# Patient Record
Sex: MALE | Race: WHITE | NOT HISPANIC OR LATINO
[De-identification: names, ages, dates, MRNs, and addresses within clinical notes are randomized per-mention and may not be internally consistent; named-entity substitution may affect disease eponyms.]

---

## 2017-02-06 ENCOUNTER — APPOINTMENT (OUTPATIENT)
Dept: UROLOGY | Facility: CLINIC | Age: 52
End: 2017-02-06

## 2017-02-06 VITALS
WEIGHT: 170 LBS | BODY MASS INDEX: 25.76 KG/M2 | SYSTOLIC BLOOD PRESSURE: 134 MMHG | HEIGHT: 68 IN | DIASTOLIC BLOOD PRESSURE: 84 MMHG | HEART RATE: 82 BPM

## 2017-02-06 DIAGNOSIS — Z78.9 OTHER SPECIFIED HEALTH STATUS: ICD-10-CM

## 2017-02-06 DIAGNOSIS — Z82.49 FAMILY HISTORY OF ISCHEMIC HEART DISEASE AND OTHER DISEASES OF THE CIRCULATORY SYSTEM: ICD-10-CM

## 2017-02-06 DIAGNOSIS — N13.8 BENIGN PROSTATIC HYPERPLASIA WITH LOWER URINARY TRACT SYMPMS: ICD-10-CM

## 2017-02-06 DIAGNOSIS — Z80.1 FAMILY HISTORY OF MALIGNANT NEOPLASM OF TRACHEA, BRONCHUS AND LUNG: ICD-10-CM

## 2017-02-06 DIAGNOSIS — F17.200 NICOTINE DEPENDENCE, UNSPECIFIED, UNCOMPLICATED: ICD-10-CM

## 2017-02-06 DIAGNOSIS — N40.1 BENIGN PROSTATIC HYPERPLASIA WITH LOWER URINARY TRACT SYMPMS: ICD-10-CM

## 2017-02-06 DIAGNOSIS — R97.20 ELEVATED PROSTATE, SPECIFIC ANTIGEN [PSA]: ICD-10-CM

## 2017-02-07 LAB
APPEARANCE: CLEAR
BACTERIA: NEGATIVE
BILIRUBIN URINE: NEGATIVE
BLOOD URINE: NEGATIVE
COLOR: YELLOW
CORE LAB FLUID CYTOLOGY: NORMAL
GLUCOSE QUALITATIVE U: NORMAL MG/DL
KETONES URINE: NEGATIVE
LEUKOCYTE ESTERASE URINE: NEGATIVE
MICROSCOPIC-UA: NORMAL
NITRITE URINE: NEGATIVE
PH URINE: 6
PROTEIN URINE: NEGATIVE MG/DL
PSA FREE FLD-MCNC: 8.5 %
PSA FREE SERPL-MCNC: 0.36 NG/ML
PSA SERPL-MCNC: 4.26 NG/ML
RED BLOOD CELLS URINE: 0 /HPF
SPECIFIC GRAVITY URINE: 1.01
SQUAMOUS EPITHELIAL CELLS: 0 /HPF
UROBILINOGEN URINE: NORMAL MG/DL
WHITE BLOOD CELLS URINE: 0 /HPF

## 2017-03-16 ENCOUNTER — APPOINTMENT (OUTPATIENT)
Dept: UROLOGY | Facility: CLINIC | Age: 52
End: 2017-03-16

## 2017-11-02 ENCOUNTER — RESULT REVIEW (OUTPATIENT)
Age: 52
End: 2017-11-02

## 2017-11-03 ENCOUNTER — OUTPATIENT (OUTPATIENT)
Dept: OUTPATIENT SERVICES | Facility: HOSPITAL | Age: 52
LOS: 1 days | End: 2017-11-03
Payer: COMMERCIAL

## 2017-11-03 DIAGNOSIS — Z98.1 ARTHRODESIS STATUS: Chronic | ICD-10-CM

## 2017-11-03 DIAGNOSIS — Z98.89 OTHER SPECIFIED POSTPROCEDURAL STATES: Chronic | ICD-10-CM

## 2017-11-03 DIAGNOSIS — C61 MALIGNANT NEOPLASM OF PROSTATE: ICD-10-CM

## 2017-11-03 LAB — SURGICAL PATHOLOGY STUDY: SIGNIFICANT CHANGE UP

## 2017-11-06 ENCOUNTER — INPATIENT (INPATIENT)
Facility: HOSPITAL | Age: 52
LOS: 1 days | Discharge: HOME | DRG: 708 | End: 2017-11-08
Attending: UROLOGY | Admitting: UROLOGY
Payer: COMMERCIAL

## 2017-11-06 ENCOUNTER — RESULT REVIEW (OUTPATIENT)
Age: 52
End: 2017-11-06

## 2017-11-06 VITALS
RESPIRATION RATE: 18 BRPM | HEIGHT: 68 IN | DIASTOLIC BLOOD PRESSURE: 71 MMHG | SYSTOLIC BLOOD PRESSURE: 115 MMHG | TEMPERATURE: 97 F | WEIGHT: 177.25 LBS | OXYGEN SATURATION: 98 % | HEART RATE: 65 BPM

## 2017-11-06 DIAGNOSIS — Z98.1 ARTHRODESIS STATUS: Chronic | ICD-10-CM

## 2017-11-06 DIAGNOSIS — S49.90XA UNSPECIFIED INJURY OF SHOULDER AND UPPER ARM, UNSPECIFIED ARM, INITIAL ENCOUNTER: Chronic | ICD-10-CM

## 2017-11-06 DIAGNOSIS — Z90.49 ACQUIRED ABSENCE OF OTHER SPECIFIED PARTS OF DIGESTIVE TRACT: Chronic | ICD-10-CM

## 2017-11-06 DIAGNOSIS — Z98.89 OTHER SPECIFIED POSTPROCEDURAL STATES: Chronic | ICD-10-CM

## 2017-11-06 DIAGNOSIS — R07.81 PLEURODYNIA: ICD-10-CM

## 2017-11-06 LAB
ANION GAP SERPL CALC-SCNC: 8 MMOL/L — SIGNIFICANT CHANGE UP (ref 5–17)
BASOPHILS NFR BLD AUTO: 0.1 % — SIGNIFICANT CHANGE UP (ref 0–2)
BUN SERPL-MCNC: 19 MG/DL — SIGNIFICANT CHANGE UP (ref 7–23)
CALCIUM SERPL-MCNC: 8.8 MG/DL — SIGNIFICANT CHANGE UP (ref 8.4–10.5)
CHLORIDE SERPL-SCNC: 100 MMOL/L — SIGNIFICANT CHANGE UP (ref 96–108)
CK MB CFR SERPL CALC: 2.6 NG/ML — SIGNIFICANT CHANGE UP (ref 0–6.7)
CK SERPL-CCNC: 113 U/L — SIGNIFICANT CHANGE UP (ref 30–200)
CO2 SERPL-SCNC: 27 MMOL/L — SIGNIFICANT CHANGE UP (ref 22–31)
CREAT SERPL-MCNC: 1.15 MG/DL — SIGNIFICANT CHANGE UP (ref 0.5–1.3)
EOSINOPHIL NFR BLD AUTO: 1.5 % — SIGNIFICANT CHANGE UP (ref 0–6)
GLUCOSE SERPL-MCNC: 133 MG/DL — HIGH (ref 70–99)
HCT VFR BLD CALC: 36.3 % — LOW (ref 39–50)
HGB BLD-MCNC: 12.5 G/DL — LOW (ref 13–17)
INR BLD: 1.06 — SIGNIFICANT CHANGE UP (ref 0.88–1.16)
LYMPHOCYTES # BLD AUTO: 17.6 % — SIGNIFICANT CHANGE UP (ref 13–44)
MCHC RBC-ENTMCNC: 27.8 PG — SIGNIFICANT CHANGE UP (ref 27–34)
MCHC RBC-ENTMCNC: 34.4 G/DL — SIGNIFICANT CHANGE UP (ref 32–36)
MCV RBC AUTO: 80.8 FL — SIGNIFICANT CHANGE UP (ref 80–100)
MONOCYTES NFR BLD AUTO: 2.2 % — SIGNIFICANT CHANGE UP (ref 2–14)
NEUTROPHILS NFR BLD AUTO: 78.6 % — HIGH (ref 43–77)
PLATELET # BLD AUTO: 161 K/UL — SIGNIFICANT CHANGE UP (ref 150–400)
POTASSIUM SERPL-MCNC: 4.8 MMOL/L — SIGNIFICANT CHANGE UP (ref 3.5–5.3)
POTASSIUM SERPL-SCNC: 4.8 MMOL/L — SIGNIFICANT CHANGE UP (ref 3.5–5.3)
PROTHROM AB SERPL-ACNC: 11.8 SEC — SIGNIFICANT CHANGE UP (ref 9.8–12.7)
RBC # BLD: 4.49 M/UL — SIGNIFICANT CHANGE UP (ref 4.2–5.8)
RBC # FLD: 12.8 % — SIGNIFICANT CHANGE UP (ref 10.3–16.9)
SODIUM SERPL-SCNC: 135 MMOL/L — SIGNIFICANT CHANGE UP (ref 135–145)
TROPONIN T SERPL-MCNC: <0.01 NG/ML — SIGNIFICANT CHANGE UP (ref 0–0.01)
WBC # BLD: 7.3 K/UL — SIGNIFICANT CHANGE UP (ref 3.8–10.5)
WBC # FLD AUTO: 7.3 K/UL — SIGNIFICANT CHANGE UP (ref 3.8–10.5)

## 2017-11-06 PROCEDURE — 71010: CPT | Mod: 26

## 2017-11-06 PROCEDURE — 93306 TTE W/DOPPLER COMPLETE: CPT | Mod: 26

## 2017-11-06 PROCEDURE — 99244 OFF/OP CNSLTJ NEW/EST MOD 40: CPT

## 2017-11-06 RX ORDER — MORPHINE SULFATE 50 MG/1
4 CAPSULE, EXTENDED RELEASE ORAL ONCE
Qty: 0 | Refills: 0 | Status: DISCONTINUED | OUTPATIENT
Start: 2017-11-06 | End: 2017-11-06

## 2017-11-06 RX ORDER — SENNA PLUS 8.6 MG/1
2 TABLET ORAL AT BEDTIME
Qty: 0 | Refills: 0 | Status: DISCONTINUED | OUTPATIENT
Start: 2017-11-06 | End: 2017-11-08

## 2017-11-06 RX ORDER — ONDANSETRON 8 MG/1
4 TABLET, FILM COATED ORAL EVERY 6 HOURS
Qty: 0 | Refills: 0 | Status: DISCONTINUED | OUTPATIENT
Start: 2017-11-06 | End: 2017-11-08

## 2017-11-06 RX ORDER — BUPROPION HYDROCHLORIDE 150 MG/1
300 TABLET, EXTENDED RELEASE ORAL DAILY
Qty: 0 | Refills: 0 | Status: DISCONTINUED | OUTPATIENT
Start: 2017-11-06 | End: 2017-11-08

## 2017-11-06 RX ORDER — LIDOCAINE 4 G/100G
1 CREAM TOPICAL
Qty: 0 | Refills: 0 | Status: DISCONTINUED | OUTPATIENT
Start: 2017-11-06 | End: 2017-11-08

## 2017-11-06 RX ORDER — BUPROPION HYDROCHLORIDE 150 MG/1
0 TABLET, EXTENDED RELEASE ORAL
Qty: 0 | Refills: 0 | COMMUNITY

## 2017-11-06 RX ORDER — OXYBUTYNIN CHLORIDE 5 MG
5 TABLET ORAL THREE TIMES A DAY
Qty: 0 | Refills: 0 | Status: DISCONTINUED | OUTPATIENT
Start: 2017-11-06 | End: 2017-11-08

## 2017-11-06 RX ORDER — BENZOCAINE AND MENTHOL 5; 1 G/100ML; G/100ML
1 LIQUID ORAL
Qty: 0 | Refills: 0 | Status: DISCONTINUED | OUTPATIENT
Start: 2017-11-06 | End: 2017-11-08

## 2017-11-06 RX ORDER — DOCUSATE SODIUM 100 MG
100 CAPSULE ORAL THREE TIMES A DAY
Qty: 0 | Refills: 0 | Status: DISCONTINUED | OUTPATIENT
Start: 2017-11-06 | End: 2017-11-08

## 2017-11-06 RX ORDER — SODIUM CHLORIDE 9 MG/ML
1000 INJECTION INTRAMUSCULAR; INTRAVENOUS; SUBCUTANEOUS
Qty: 0 | Refills: 0 | Status: DISCONTINUED | OUTPATIENT
Start: 2017-11-06 | End: 2017-11-08

## 2017-11-06 RX ORDER — ACETAMINOPHEN 500 MG
650 TABLET ORAL EVERY 6 HOURS
Qty: 0 | Refills: 0 | Status: DISCONTINUED | OUTPATIENT
Start: 2017-11-06 | End: 2017-11-08

## 2017-11-06 RX ORDER — OXYCODONE AND ACETAMINOPHEN 5; 325 MG/1; MG/1
1 TABLET ORAL EVERY 4 HOURS
Qty: 0 | Refills: 0 | Status: DISCONTINUED | OUTPATIENT
Start: 2017-11-06 | End: 2017-11-08

## 2017-11-06 RX ORDER — HYDROMORPHONE HYDROCHLORIDE 2 MG/ML
0.5 INJECTION INTRAMUSCULAR; INTRAVENOUS; SUBCUTANEOUS ONCE
Qty: 0 | Refills: 0 | Status: DISCONTINUED | OUTPATIENT
Start: 2017-11-06 | End: 2017-11-06

## 2017-11-06 RX ORDER — OXYCODONE AND ACETAMINOPHEN 5; 325 MG/1; MG/1
2 TABLET ORAL EVERY 6 HOURS
Qty: 0 | Refills: 0 | Status: DISCONTINUED | OUTPATIENT
Start: 2017-11-06 | End: 2017-11-08

## 2017-11-06 RX ORDER — CEFTRIAXONE 500 MG/1
1 INJECTION, POWDER, FOR SOLUTION INTRAMUSCULAR; INTRAVENOUS EVERY 24 HOURS
Qty: 0 | Refills: 0 | Status: DISCONTINUED | OUTPATIENT
Start: 2017-11-06 | End: 2017-11-08

## 2017-11-06 RX ORDER — DIAZEPAM 5 MG
5 TABLET ORAL THREE TIMES A DAY
Qty: 0 | Refills: 0 | Status: DISCONTINUED | OUTPATIENT
Start: 2017-11-06 | End: 2017-11-08

## 2017-11-06 RX ADMIN — Medication 100 MILLIGRAM(S): at 21:34

## 2017-11-06 RX ADMIN — Medication 5 MILLIGRAM(S): at 20:42

## 2017-11-06 RX ADMIN — Medication 650 MILLIGRAM(S): at 14:56

## 2017-11-06 RX ADMIN — SENNA PLUS 2 TABLET(S): 8.6 TABLET ORAL at 21:33

## 2017-11-06 RX ADMIN — Medication 1 MILLIGRAM(S): at 10:24

## 2017-11-06 RX ADMIN — MORPHINE SULFATE 4 MILLIGRAM(S): 50 CAPSULE, EXTENDED RELEASE ORAL at 11:00

## 2017-11-06 RX ADMIN — MORPHINE SULFATE 4 MILLIGRAM(S): 50 CAPSULE, EXTENDED RELEASE ORAL at 23:47

## 2017-11-06 RX ADMIN — BENZOCAINE AND MENTHOL 1 LOZENGE: 5; 1 LIQUID ORAL at 23:56

## 2017-11-06 RX ADMIN — MORPHINE SULFATE 102 MILLIGRAM(S): 50 CAPSULE, EXTENDED RELEASE ORAL at 10:15

## 2017-11-06 RX ADMIN — Medication 5 MILLIGRAM(S): at 11:54

## 2017-11-06 RX ADMIN — Medication 5 MILLIGRAM(S): at 14:56

## 2017-11-06 RX ADMIN — HYDROMORPHONE HYDROCHLORIDE 0.5 MILLIGRAM(S): 2 INJECTION INTRAMUSCULAR; INTRAVENOUS; SUBCUTANEOUS at 09:50

## 2017-11-06 RX ADMIN — BUPROPION HYDROCHLORIDE 300 MILLIGRAM(S): 150 TABLET, EXTENDED RELEASE ORAL at 18:08

## 2017-11-06 RX ADMIN — Medication 650 MILLIGRAM(S): at 15:56

## 2017-11-06 RX ADMIN — Medication 650 MILLIGRAM(S): at 21:33

## 2017-11-06 RX ADMIN — OXYCODONE AND ACETAMINOPHEN 2 TABLET(S): 5; 325 TABLET ORAL at 13:00

## 2017-11-06 RX ADMIN — CEFTRIAXONE 100 GRAM(S): 500 INJECTION, POWDER, FOR SOLUTION INTRAMUSCULAR; INTRAVENOUS at 18:35

## 2017-11-06 RX ADMIN — MORPHINE SULFATE 102 MILLIGRAM(S): 50 CAPSULE, EXTENDED RELEASE ORAL at 10:24

## 2017-11-06 RX ADMIN — SODIUM CHLORIDE 125 MILLILITER(S): 9 INJECTION INTRAMUSCULAR; INTRAVENOUS; SUBCUTANEOUS at 13:10

## 2017-11-06 RX ADMIN — OXYCODONE AND ACETAMINOPHEN 2 TABLET(S): 5; 325 TABLET ORAL at 11:54

## 2017-11-06 RX ADMIN — HYDROMORPHONE HYDROCHLORIDE 0.5 MILLIGRAM(S): 2 INJECTION INTRAMUSCULAR; INTRAVENOUS; SUBCUTANEOUS at 10:21

## 2017-11-06 RX ADMIN — Medication 650 MILLIGRAM(S): at 22:30

## 2017-11-06 NOTE — CONSULT NOTE ADULT - SUBJECTIVE AND OBJECTIVE BOX
52 year old M, PMH significant for prostate cancer, admitted to urology service for robotic-assisted laparoscopic prostatectomy (RALP) POD #0, former smoker with COPD, spinal stenosis. Cardiology team consulted regarding chest pain post procedure      PAST MEDICAL & SURGICAL HISTORY:  Prostate cancer  Nephritis  Herniated disc, cervical: and lumbar  Spinal stenosis of lumbar region  Spinal stenosis in cervical region  Anxiety and depression  Injury of shoulder: right rotator cuff  History of appendectomy  S/P lumbar spinal fusion: x2-L5, S1 and 2nd surgery had L4 &quot;within the last 3 years&quot;  S/P cervical spinal fusion: C4, C5, C6-&quot;within the last 3 years&quot;  H/O cystoscopy: 1977    FAMILY HISTORY:  Family history of renal disease (Father)  Family history of CHF (congestive heart failure) (Father)    Social:  Allergies    No Known Allergies    Intolerances    	  MEDICATIONS:    cefTRIAXone   IVPB 1 Gram(s) IV Intermittent every 24 hours      buPROPion XL . 300 milliGRAM(s) Oral daily  diazepam    Tablet 5 milliGRAM(s) Oral three times a day PRN  ondansetron Injectable 4 milliGRAM(s) IV Push every 6 hours PRN  oxyCODONE    5 mG/acetaminophen 325 mG 1 Tablet(s) Oral every 4 hours PRN  oxyCODONE    5 mG/acetaminophen 325 mG 2 Tablet(s) Oral every 6 hours PRN        benzocaine 15 mG/menthol 3.6 mG Lozenge 1 Lozenge Oral every 2 hours PRN  lidocaine 2% Gel 1 Application(s) Topical every 3 hours PRN  oxybutynin 5 milliGRAM(s) Oral three times a day PRN  sodium chloride 0.9%. 1000 milliLiter(s) IV Continuous <Continuous>            PHYSICAL EXAM:  T(C): 36.4 (11-06-17 @ 09:21), Max: 36.4 (11-06-17 @ 09:21)  HR: 64 (11-06-17 @ 12:21) (60 - 72)  BP: 125/73 (11-06-17 @ 12:21) (115/71 - 151/87)  RR: 10 (11-06-17 @ 12:21) (9 - 19)  SpO2: 98% (11-06-17 @ 12:21) (93% - 100%)  Wt(kg): --  I&O's Summary    06 Nov 2017 07:01  -  06 Nov 2017 13:11  --------------------------------------------------------  IN: 500 mL / OUT: 440 mL / NET: 60 mL      Height (cm): 172.72 (11-06 @ 06:25)  Weight (kg): 80.4 (11-06 @ 06:25)  BMI (kg/m2): 27 (11-06 @ 06:25)  BSA (m2): 1.94 (11-06 @ 06:25)    Gen: WDWN M in NAD, resting comfortably on bed at time of examination  Neck: no JVD  Cardiovascular: Normal S1 S2, No murmurs, rubs, or gallops appreciated. Reproducible pain to palpation on R chest  Respiratory: CTAB. Patient has pleuritic pain upon deep inspiration  Gastrointestinal:  Soft, TTP post operative   Ext: no lower extremity edema appreciated  Neuro: no focal deficits.  Vascular: Peripheral pulses palpable 2+ bilaterally    TELEMETRY: 	    ECG: Normal sinus, no acute ST-T changes suggestive of ischemia 	  RADIOLOGY: CXR without evidence of pleural effusion or consolidation  DIAGNOSTIC TESTING:  [X] Echocardiogram: No cardiac abnormalities  [ ]  Catheterization:  [ ] Stress Test:    OTHER: 	    LABS:	 	    CARDIAC MARKERS: troponin T, CK, CKMB WNL x1.                                   12.5   7.3   )-----------( 161      ( 06 Nov 2017 09:45 )             36.3     11-06    135  |  100  |  19  ----------------------------<  133<H>  4.8   |  27  |  1.15    Ca    8.8      06 Nov 2017 09:45      proBNP:   Lipid Profile:   HgA1c:   TSH:

## 2017-11-06 NOTE — PATIENT PROFILE ADULT. - PMH
Anxiety and depression    Herniated disc, cervical  and lumbar  Nephritis    Spinal stenosis in cervical region    Spinal stenosis of lumbar region

## 2017-11-06 NOTE — CONSULT NOTE ADULT - PROBLEM SELECTOR RECOMMENDATION 9
- Patient with exam findings reproducible chest pain on palpation, worse with inspiration, sharp  - Likely pleuritic etiology given above findings, less likely ischemic in setting of lack of cardiac history and lack of typical anginal attributes  - Troponin T, CK, CKMB negative x1, EKG reviewed normal sinus rhythm with no acute ST-T changes suggestive of ischemia; echocardiogram shows no evidence of cardiac abnormalities  - No need for telemetry at this time  - Suspect pleurisy/costochondritis, chest pain noncardiac in origin

## 2017-11-06 NOTE — CONSULT NOTE ADULT - ASSESSMENT
52M, PMH prostate cancer, POD #0 s/p lap prostatectomy, former smoker with COPD, spinal stenosis. Cardiology consulted for chest pain evaluation

## 2017-11-06 NOTE — H&P ADULT - FAMILY HISTORY
Father  Still living? No  Family history of CHF (congestive heart failure), Age at diagnosis: Age Unknown  Family history of renal disease, Age at diagnosis: Age Unknown

## 2017-11-06 NOTE — PROGRESS NOTE ADULT - SUBJECTIVE AND OBJECTIVE BOX
Pt reporting chest pain. The pain is on the right side and is sharp in nature. The pain is radiating up to the right side of his neck. Of note patient with prior history of neck and back surgery. No cardiac history. Anesthesia saw patient who recommended an EKG and a cardiology consult. 8mg morphine given. EKG shows NSR. AVSS. Cardiology to see- recommending for now a CXR, cardiac enzymes and an ECHO, all of which have been ordered.             Vital Signs Last 24 Hrs  T(C): 36.4 (06 Nov 2017 09:21), Max: 36.4 (06 Nov 2017 09:21)  T(F): 97.6 (06 Nov 2017 09:21), Max: 97.6 (06 Nov 2017 09:21)  HR: 64 (06 Nov 2017 10:51) (62 - 70)  BP: 121/80 (06 Nov 2017 10:21) (115/71 - 151/87)  BP(mean): 94 (06 Nov 2017 10:21) (94 - 113)  RR: 13 (06 Nov 2017 10:51) (9 - 18)  SpO2: 93% (06 Nov 2017 10:51) (93% - 100%)    I&O's Summary    06 Nov 2017 07:01  -  06 Nov 2017 10:55  --------------------------------------------------------  IN: 125 mL / OUT: 0 mL / NET: 125 mL        Gen: NAD    Abd: softly distended, pravin TTP, incisions CDI    : cifuentes draining clear                           12.5   7.3   )-----------( 161      ( 06 Nov 2017 09:45 )             36.3     11-06    135  |  100  |  19  ----------------------------<  133<H>  4.8   |  27  |  1.15    Ca    8.8      06 Nov 2017 09:45      cultures    A/P: 52M hx cap s/p RALP 11/6. Pt complaining of chest pain in the PACU  1- Cifuentes- monitor uop  2- CLIFTON- monitor output  3- NPO  4- cards to see: fu CXR, ECHO, cardiac enzymes  5- oob amb, IS  6- pain meds PRN

## 2017-11-06 NOTE — H&P ADULT - PMH
Anxiety and depression    Herniated disc, cervical  and lumbar  Nephritis    Spinal stenosis in cervical region    Spinal stenosis of lumbar region Anxiety and depression    Herniated disc, cervical  and lumbar  Nephritis    Prostate cancer    Spinal stenosis in cervical region    Spinal stenosis of lumbar region

## 2017-11-06 NOTE — PATIENT PROFILE ADULT. - PSH
H/O cystoscopy  1977  History of appendectomy    Injury of shoulder  right rotator cuff  S/P cervical spinal fusion  C4, C5, C6-"within the last 3 years"  S/P lumbar spinal fusion  x2-L5, S1 and 2nd surgery had L4 "within the last 3 years"

## 2017-11-07 ENCOUNTER — TRANSCRIPTION ENCOUNTER (OUTPATIENT)
Age: 52
End: 2017-11-07

## 2017-11-07 DIAGNOSIS — C61 MALIGNANT NEOPLASM OF PROSTATE: ICD-10-CM

## 2017-11-07 LAB
ANION GAP SERPL CALC-SCNC: 11 MMOL/L — SIGNIFICANT CHANGE UP (ref 5–17)
BASOPHILS NFR BLD AUTO: 0.1 % — SIGNIFICANT CHANGE UP (ref 0–2)
BUN SERPL-MCNC: 11 MG/DL — SIGNIFICANT CHANGE UP (ref 7–23)
CALCIUM SERPL-MCNC: 9.7 MG/DL — SIGNIFICANT CHANGE UP (ref 8.4–10.5)
CHLORIDE SERPL-SCNC: 105 MMOL/L — SIGNIFICANT CHANGE UP (ref 96–108)
CO2 SERPL-SCNC: 27 MMOL/L — SIGNIFICANT CHANGE UP (ref 22–31)
CREAT FLD-MCNC: 0.9 MG/DL — SIGNIFICANT CHANGE UP
CREAT SERPL-MCNC: 0.93 MG/DL — SIGNIFICANT CHANGE UP (ref 0.5–1.3)
EOSINOPHIL NFR BLD AUTO: 0.2 % — SIGNIFICANT CHANGE UP (ref 0–6)
GLUCOSE SERPL-MCNC: 130 MG/DL — HIGH (ref 70–99)
HCT VFR BLD CALC: 39.9 % — SIGNIFICANT CHANGE UP (ref 39–50)
HGB BLD-MCNC: 13.5 G/DL — SIGNIFICANT CHANGE UP (ref 13–17)
LYMPHOCYTES # BLD AUTO: 17.4 % — SIGNIFICANT CHANGE UP (ref 13–44)
MAGNESIUM SERPL-MCNC: 2.3 MG/DL — SIGNIFICANT CHANGE UP (ref 1.6–2.6)
MCHC RBC-ENTMCNC: 27.5 PG — SIGNIFICANT CHANGE UP (ref 27–34)
MCHC RBC-ENTMCNC: 33.8 G/DL — SIGNIFICANT CHANGE UP (ref 32–36)
MCV RBC AUTO: 81.3 FL — SIGNIFICANT CHANGE UP (ref 80–100)
MONOCYTES NFR BLD AUTO: 8.3 % — SIGNIFICANT CHANGE UP (ref 2–14)
NEUTROPHILS NFR BLD AUTO: 74 % — SIGNIFICANT CHANGE UP (ref 43–77)
PHOSPHATE SERPL-MCNC: 3.4 MG/DL — SIGNIFICANT CHANGE UP (ref 2.5–4.5)
PLATELET # BLD AUTO: 236 K/UL — SIGNIFICANT CHANGE UP (ref 150–400)
POTASSIUM SERPL-MCNC: 4.5 MMOL/L — SIGNIFICANT CHANGE UP (ref 3.5–5.3)
POTASSIUM SERPL-SCNC: 4.5 MMOL/L — SIGNIFICANT CHANGE UP (ref 3.5–5.3)
RBC # BLD: 4.91 M/UL — SIGNIFICANT CHANGE UP (ref 4.2–5.8)
RBC # FLD: 13.2 % — SIGNIFICANT CHANGE UP (ref 10.3–16.9)
SODIUM SERPL-SCNC: 143 MMOL/L — SIGNIFICANT CHANGE UP (ref 135–145)
SURGICAL PATHOLOGY STUDY: SIGNIFICANT CHANGE UP
WBC # BLD: 9.7 K/UL — SIGNIFICANT CHANGE UP (ref 3.8–10.5)
WBC # FLD AUTO: 9.7 K/UL — SIGNIFICANT CHANGE UP (ref 3.8–10.5)

## 2017-11-07 RX ORDER — DOCUSATE SODIUM 100 MG
1 CAPSULE ORAL
Qty: 12 | Refills: 0 | OUTPATIENT
Start: 2017-11-07 | End: 2017-11-13

## 2017-11-07 RX ORDER — OXYBUTYNIN CHLORIDE 5 MG
1 TABLET ORAL
Qty: 6 | Refills: 0 | OUTPATIENT
Start: 2017-11-07 | End: 2017-11-13

## 2017-11-07 RX ORDER — SIMETHICONE 80 MG/1
80 TABLET, CHEWABLE ORAL ONCE
Qty: 0 | Refills: 0 | Status: COMPLETED | OUTPATIENT
Start: 2017-11-07 | End: 2017-11-07

## 2017-11-07 RX ORDER — MOXIFLOXACIN HYDROCHLORIDE TABLETS, 400 MG 400 MG/1
1 TABLET, FILM COATED ORAL
Qty: 10 | Refills: 0 | OUTPATIENT
Start: 2017-11-07 | End: 2017-11-12

## 2017-11-07 RX ADMIN — Medication 650 MILLIGRAM(S): at 07:33

## 2017-11-07 RX ADMIN — Medication 650 MILLIGRAM(S): at 13:22

## 2017-11-07 RX ADMIN — SIMETHICONE 80 MILLIGRAM(S): 80 TABLET, CHEWABLE ORAL at 21:34

## 2017-11-07 RX ADMIN — BUPROPION HYDROCHLORIDE 300 MILLIGRAM(S): 150 TABLET, EXTENDED RELEASE ORAL at 11:18

## 2017-11-07 RX ADMIN — Medication 650 MILLIGRAM(S): at 06:33

## 2017-11-07 RX ADMIN — MORPHINE SULFATE 4 MILLIGRAM(S): 50 CAPSULE, EXTENDED RELEASE ORAL at 00:02

## 2017-11-07 RX ADMIN — Medication 100 MILLIGRAM(S): at 11:18

## 2017-11-07 RX ADMIN — Medication 5 MILLIGRAM(S): at 09:01

## 2017-11-07 RX ADMIN — Medication 650 MILLIGRAM(S): at 14:22

## 2017-11-07 RX ADMIN — SENNA PLUS 2 TABLET(S): 8.6 TABLET ORAL at 21:12

## 2017-11-07 RX ADMIN — Medication 100 MILLIGRAM(S): at 05:16

## 2017-11-07 RX ADMIN — Medication 5 MILLIGRAM(S): at 22:37

## 2017-11-07 RX ADMIN — Medication 100 MILLIGRAM(S): at 21:12

## 2017-11-07 RX ADMIN — Medication 5 MILLIGRAM(S): at 17:43

## 2017-11-07 RX ADMIN — Medication 5 MILLIGRAM(S): at 13:22

## 2017-11-07 RX ADMIN — CEFTRIAXONE 100 GRAM(S): 500 INJECTION, POWDER, FOR SOLUTION INTRAMUSCULAR; INTRAVENOUS at 17:35

## 2017-11-07 NOTE — DISCHARGE NOTE ADULT - CARE PLAN
Principal Discharge DX:	Prostate cancer  Goal:	improvement after surgery  Instructions for follow-up, activity and diet:	Advance diet with BM. Activity as tolerated. Ugalde to leg bag. Keep incisions CDI. If fever >100.4 or any change or worsening of symptoms please call doctor or report to ED. Make follow up appointment with Dr Teixeira call office [phone]

## 2017-11-07 NOTE — DISCHARGE NOTE ADULT - MEDICATION SUMMARY - MEDICATIONS TO TAKE
I will START or STAY ON the medications listed below when I get home from the hospital:    Colace 100 mg oral capsule  -- 1 cap(s) by mouth 2 times a day, As Needed -for constipation   -- Medication should be taken with plenty of water.    -- Indication: For for constipation    Cipro 500 mg oral tablet  -- 1 tab(s) by mouth every 12 hours start day before apt  -- Avoid prolonged or excessive exposure to direct and/or artificial sunlight while taking this medication.  Check with your doctor before becoming pregnant.  Do not take dairy products, antacids, or iron preparations within one hour of this medication.  Finish all this medication unless otherwise directed by prescriber.  Medication should be taken with plenty of water.    -- Indication: For Infection prophylaxis    Wellbutrin  -- 300 mg Qday  -- Indication: For Home med    Ditropan XL 15 mg/24 hr oral tablet, extended release  -- 1 tab(s) by mouth once a day, As Needed -for bladder spasms stop day before apt  -- May cause drowsiness.  Alcohol may intensify this effect.  Use care when operating dangerous machinery.  Swallow whole.  Do not crush.    -- Indication: For bladder spasms

## 2017-11-07 NOTE — DISCHARGE NOTE ADULT - CARE PROVIDER_API CALL
Mauricio Teixeira), Urology  [address] [place] [address], [place] [id number]  Phone: [phone]  Fax: [phone]

## 2017-11-07 NOTE — DISCHARGE NOTE ADULT - PLAN OF CARE
improvement after surgery Advance diet with BM. Activity as tolerated. Ugalde to leg bag. Keep incisions CDI. If fever >100.4 or any change or worsening of symptoms please call doctor or report to ED. Make follow up appointment with Dr Teixeira call office [phone]

## 2017-11-07 NOTE — DISCHARGE NOTE ADULT - PATIENT PORTAL LINK FT
“You can access the [facility] Patient Portal, offered by [facility], by registering with the following website: [email or web address]”

## 2017-11-07 NOTE — DISCHARGE NOTE ADULT - HOSPITAL COURSE
52M history cap underwent RALP 11/6 . Tolerated procedure well. VSS, afebrile and hemodynamically stable.

## 2017-11-08 VITALS
RESPIRATION RATE: 17 BRPM | SYSTOLIC BLOOD PRESSURE: 129 MMHG | DIASTOLIC BLOOD PRESSURE: 77 MMHG | HEART RATE: 82 BPM | OXYGEN SATURATION: 97 % | TEMPERATURE: 97 F

## 2017-11-08 LAB
ANION GAP SERPL CALC-SCNC: 12 MMOL/L — SIGNIFICANT CHANGE UP (ref 5–17)
BASOPHILS NFR BLD AUTO: 0.1 % — SIGNIFICANT CHANGE UP (ref 0–2)
BUN SERPL-MCNC: 11 MG/DL — SIGNIFICANT CHANGE UP (ref 7–23)
CALCIUM SERPL-MCNC: 9.3 MG/DL — SIGNIFICANT CHANGE UP (ref 8.4–10.5)
CHLORIDE SERPL-SCNC: 102 MMOL/L — SIGNIFICANT CHANGE UP (ref 96–108)
CO2 SERPL-SCNC: 27 MMOL/L — SIGNIFICANT CHANGE UP (ref 22–31)
CREAT SERPL-MCNC: 1.17 MG/DL — SIGNIFICANT CHANGE UP (ref 0.5–1.3)
EOSINOPHIL NFR BLD AUTO: 1.3 % — SIGNIFICANT CHANGE UP (ref 0–6)
GLUCOSE SERPL-MCNC: 94 MG/DL — SIGNIFICANT CHANGE UP (ref 70–99)
HCT VFR BLD CALC: 36.8 % — LOW (ref 39–50)
HGB BLD-MCNC: 12.3 G/DL — LOW (ref 13–17)
LYMPHOCYTES # BLD AUTO: 30.2 % — SIGNIFICANT CHANGE UP (ref 13–44)
MAGNESIUM SERPL-MCNC: 2.2 MG/DL — SIGNIFICANT CHANGE UP (ref 1.6–2.6)
MCHC RBC-ENTMCNC: 27.3 PG — SIGNIFICANT CHANGE UP (ref 27–34)
MCHC RBC-ENTMCNC: 33.4 G/DL — SIGNIFICANT CHANGE UP (ref 32–36)
MCV RBC AUTO: 81.6 FL — SIGNIFICANT CHANGE UP (ref 80–100)
MONOCYTES NFR BLD AUTO: 7.2 % — SIGNIFICANT CHANGE UP (ref 2–14)
NEUTROPHILS NFR BLD AUTO: 61.2 % — SIGNIFICANT CHANGE UP (ref 43–77)
PHOSPHATE SERPL-MCNC: 3.4 MG/DL — SIGNIFICANT CHANGE UP (ref 2.5–4.5)
PLATELET # BLD AUTO: 188 K/UL — SIGNIFICANT CHANGE UP (ref 150–400)
POTASSIUM SERPL-MCNC: 4.2 MMOL/L — SIGNIFICANT CHANGE UP (ref 3.5–5.3)
POTASSIUM SERPL-SCNC: 4.2 MMOL/L — SIGNIFICANT CHANGE UP (ref 3.5–5.3)
RBC # BLD: 4.51 M/UL — SIGNIFICANT CHANGE UP (ref 4.2–5.8)
RBC # FLD: 13.3 % — SIGNIFICANT CHANGE UP (ref 10.3–16.9)
SODIUM SERPL-SCNC: 141 MMOL/L — SIGNIFICANT CHANGE UP (ref 135–145)
WBC # BLD: 7.6 K/UL — SIGNIFICANT CHANGE UP (ref 3.8–10.5)
WBC # FLD AUTO: 7.6 K/UL — SIGNIFICANT CHANGE UP (ref 3.8–10.5)

## 2017-11-08 PROCEDURE — S2900: CPT

## 2017-11-08 PROCEDURE — 82570 ASSAY OF URINE CREATININE: CPT

## 2017-11-08 PROCEDURE — 86850 RBC ANTIBODY SCREEN: CPT

## 2017-11-08 PROCEDURE — 85025 COMPLETE CBC W/AUTO DIFF WBC: CPT

## 2017-11-08 PROCEDURE — 80048 BASIC METABOLIC PNL TOTAL CA: CPT

## 2017-11-08 PROCEDURE — 85610 PROTHROMBIN TIME: CPT

## 2017-11-08 PROCEDURE — 86901 BLOOD TYPING SEROLOGIC RH(D): CPT

## 2017-11-08 PROCEDURE — 86900 BLOOD TYPING SEROLOGIC ABO: CPT

## 2017-11-08 PROCEDURE — 88307 TISSUE EXAM BY PATHOLOGIST: CPT

## 2017-11-08 PROCEDURE — 84100 ASSAY OF PHOSPHORUS: CPT

## 2017-11-08 PROCEDURE — 83735 ASSAY OF MAGNESIUM: CPT

## 2017-11-08 PROCEDURE — 93306 TTE W/DOPPLER COMPLETE: CPT

## 2017-11-08 PROCEDURE — 88309 TISSUE EXAM BY PATHOLOGIST: CPT

## 2017-11-08 PROCEDURE — 82550 ASSAY OF CK (CPK): CPT

## 2017-11-08 PROCEDURE — 82553 CREATINE MB FRACTION: CPT

## 2017-11-08 PROCEDURE — 36415 COLL VENOUS BLD VENIPUNCTURE: CPT

## 2017-11-08 PROCEDURE — 84484 ASSAY OF TROPONIN QUANT: CPT

## 2017-11-08 PROCEDURE — 86923 COMPATIBILITY TEST ELECTRIC: CPT

## 2017-11-08 PROCEDURE — 71045 X-RAY EXAM CHEST 1 VIEW: CPT

## 2017-11-08 RX ADMIN — Medication 10 MILLIGRAM(S): at 08:26

## 2017-11-08 RX ADMIN — Medication 650 MILLIGRAM(S): at 03:10

## 2017-11-08 RX ADMIN — Medication 650 MILLIGRAM(S): at 04:10

## 2017-11-08 RX ADMIN — LIDOCAINE 1 APPLICATION(S): 4 CREAM TOPICAL at 07:48

## 2017-11-08 RX ADMIN — Medication 100 MILLIGRAM(S): at 06:10

## 2017-11-08 NOTE — PROGRESS NOTE ADULT - SUBJECTIVE AND OBJECTIVE BOX
INTERVAL HPI/OVERNIGHT EVENTS:  No acute events overnight.    VITALS:    T(F): 98.2 (11-08-17 @ 05:19), Max: 98.7 (11-07-17 @ 21:09)  HR: 69 (11-08-17 @ 05:19) (64 - 76)  BP: 113/69 (11-08-17 @ 05:19) (113/69 - 138/86)  RR: 18 (11-08-17 @ 05:19) (17 - 18)  SpO2: 97% (11-08-17 @ 05:19) (97% - 100%)  Wt(kg): --    I&O's Detail    06 Nov 2017 07:01  -  07 Nov 2017 07:00  --------------------------------------------------------  IN:    sodium chloride 0.9%.: 1375 mL  Total IN: 1375 mL    OUT:    Bulb: 160 mL    Indwelling Catheter - Urethral: 5175 mL  Total OUT: 5335 mL    Total NET: -3960 mL      07 Nov 2017 07:01  -  08 Nov 2017 06:04  --------------------------------------------------------  IN:  Total IN: 0 mL    OUT:    Bulb: 78 mL    Indwelling Catheter - Urethral: 1925 mL  Total OUT: 2003 mL    Total NET: -2003 mL          MEDICATIONS:    ANTIBIOTICS:  cefTRIAXone   IVPB 1 Gram(s) IV Intermittent every 24 hours      PAIN CONTROL:  acetaminophen   Tablet. 650 milliGRAM(s) Oral every 6 hours PRN  buPROPion XL . 300 milliGRAM(s) Oral daily  diazepam    Tablet 5 milliGRAM(s) Oral three times a day PRN  ondansetron Injectable 4 milliGRAM(s) IV Push every 6 hours PRN  oxyCODONE    5 mG/acetaminophen 325 mG 1 Tablet(s) Oral every 4 hours PRN  oxyCODONE    5 mG/acetaminophen 325 mG 2 Tablet(s) Oral every 6 hours PRN       MEDS:  oxybutynin 5 milliGRAM(s) Oral three times a day PRN      HEME/ONC        PHYSICAL EXAM:  General: No acute distress.  Alert and Oriented  Abdominal Exam: soft, incision sites clean and dry, CLIFTON intact serosanguinous   Exam: FC intact, urine clear      LABS:                        13.5   9.7   )-----------( 236      ( 07 Nov 2017 05:58 )             39.9     11-07    143  |  105  |  11  ----------------------------<  130<H>  4.5   |  27  |  0.93    Ca    9.7      07 Nov 2017 05:57  Phos  3.4     11-07  Mg     2.3     11-07      PT/INR - ( 06 Nov 2017 09:45 )   PT: 11.8 sec;   INR: 1.06                RADIOLOGY & ADDITIONAL TESTS:

## 2017-11-10 DIAGNOSIS — C61 MALIGNANT NEOPLASM OF PROSTATE: ICD-10-CM

## 2017-11-10 DIAGNOSIS — R07.81 PLEURODYNIA: ICD-10-CM

## 2017-11-10 DIAGNOSIS — J44.9 CHRONIC OBSTRUCTIVE PULMONARY DISEASE, UNSPECIFIED: ICD-10-CM

## 2017-11-10 DIAGNOSIS — M48.02 SPINAL STENOSIS, CERVICAL REGION: ICD-10-CM

## 2017-11-10 DIAGNOSIS — M48.061 SPINAL STENOSIS, LUMBAR REGION WITHOUT NEUROGENIC CLAUDICATION: ICD-10-CM

## 2017-11-10 DIAGNOSIS — F32.9 MAJOR DEPRESSIVE DISORDER, SINGLE EPISODE, UNSPECIFIED: ICD-10-CM

## 2017-11-10 DIAGNOSIS — N40.0 BENIGN PROSTATIC HYPERPLASIA WITHOUT LOWER URINARY TRACT SYMPTOMS: ICD-10-CM

## 2017-11-11 RX ORDER — PHENAZOPYRIDINE HCL 100 MG
200 TABLET ORAL ONCE
Qty: 0 | Refills: 0 | Status: COMPLETED | OUTPATIENT
Start: 2017-11-13 | End: 2017-11-13

## 2017-11-13 ENCOUNTER — OUTPATIENT (OUTPATIENT)
Dept: OUTPATIENT SERVICES | Facility: HOSPITAL | Age: 52
LOS: 1 days | Discharge: HOME | End: 2017-11-13
Payer: COMMERCIAL

## 2017-11-13 VITALS
TEMPERATURE: 97 F | SYSTOLIC BLOOD PRESSURE: 127 MMHG | DIASTOLIC BLOOD PRESSURE: 68 MMHG | RESPIRATION RATE: 20 BRPM | WEIGHT: 171.96 LBS | OXYGEN SATURATION: 100 % | HEIGHT: 68 IN | HEART RATE: 61 BPM

## 2017-11-13 VITALS
HEART RATE: 74 BPM | RESPIRATION RATE: 16 BRPM | OXYGEN SATURATION: 98 % | DIASTOLIC BLOOD PRESSURE: 72 MMHG | SYSTOLIC BLOOD PRESSURE: 124 MMHG | TEMPERATURE: 98 F

## 2017-11-13 DIAGNOSIS — Z98.89 OTHER SPECIFIED POSTPROCEDURAL STATES: Chronic | ICD-10-CM

## 2017-11-13 DIAGNOSIS — Z98.1 ARTHRODESIS STATUS: Chronic | ICD-10-CM

## 2017-11-13 DIAGNOSIS — S49.90XA UNSPECIFIED INJURY OF SHOULDER AND UPPER ARM, UNSPECIFIED ARM, INITIAL ENCOUNTER: Chronic | ICD-10-CM

## 2017-11-13 DIAGNOSIS — Z90.79 ACQUIRED ABSENCE OF OTHER GENITAL ORGAN(S): Chronic | ICD-10-CM

## 2017-11-13 DIAGNOSIS — Z90.49 ACQUIRED ABSENCE OF OTHER SPECIFIED PARTS OF DIGESTIVE TRACT: Chronic | ICD-10-CM

## 2017-11-13 PROCEDURE — 51600 INJECTION FOR BLADDER X-RAY: CPT

## 2017-11-13 PROCEDURE — 74455 X-RAY URETHRA/BLADDER: CPT

## 2017-11-13 PROCEDURE — 88321 CONSLTJ&REPRT SLD PREP ELSWR: CPT

## 2017-11-13 RX ORDER — ONDANSETRON 8 MG/1
4 TABLET, FILM COATED ORAL ONCE
Qty: 0 | Refills: 0 | Status: DISCONTINUED | OUTPATIENT
Start: 2017-11-13 | End: 2017-11-13

## 2017-11-13 RX ORDER — SODIUM CHLORIDE 9 MG/ML
1000 INJECTION, SOLUTION INTRAVENOUS
Qty: 0 | Refills: 0 | Status: DISCONTINUED | OUTPATIENT
Start: 2017-11-13 | End: 2017-11-13

## 2017-11-13 RX ADMIN — Medication 200 MILLIGRAM(S): at 08:44

## 2017-11-13 NOTE — ASU PATIENT PROFILE, ADULT - PMH
Anxiety and depression    Herniated disc, cervical  and lumbar  Nephritis    Prostate cancer    Spinal stenosis in cervical region    Spinal stenosis of lumbar region

## 2017-11-13 NOTE — ASU PATIENT PROFILE, ADULT - PSH
H/O cystoscopy  1977  History of appendectomy    Injury of shoulder  right rotator cuff  S/P cervical spinal fusion  C4, C5, C6-"within the last 3 years"  S/P lumbar spinal fusion  x2-L5, S1 and 2nd surgery had L4 "within the last 3 years" H/O cystoscopy  1977  H/O prostatectomy    History of appendectomy    Injury of shoulder  right rotator cuff  S/P cervical spinal fusion  C4, C5, C6-"within the last 3 years"  S/P lumbar spinal fusion  x2-L5, S1 and 2nd surgery had L4 "within the last 3 years"

## 2018-07-25 PROBLEM — Z78.9 ALCOHOL USE: Status: ACTIVE | Noted: 2017-02-06

## 2018-09-03 PROBLEM — R97.20 ELEVATED PROSTATE SPECIFIC ANTIGEN (PSA): Status: ACTIVE | Noted: 2017-02-06

## 2021-11-30 NOTE — PROGRESS NOTE ADULT - SUBJECTIVE AND OBJECTIVE BOX
INTERVAL HPI/OVERNIGHT EVENTS:  No acute events overnight.    VITALS:    T(F): 98 (11-07-17 @ 05:23), Max: 98.4 (11-06-17 @ 13:00)  HR: 72 (11-07-17 @ 05:23) (60 - 75)  BP: 122/78 (11-07-17 @ 05:23) (115/71 - 151/87)  RR: 18 (11-07-17 @ 05:23) (9 - 19)  SpO2: 97% (11-07-17 @ 05:23) (93% - 100%)  Wt(kg): --    I&O's Detail    06 Nov 2017 07:01  -  07 Nov 2017 05:40  --------------------------------------------------------  IN:    sodium chloride 0.9%.: 1375 mL  Total IN: 1375 mL    OUT:    Bulb: 160 mL    Indwelling Catheter - Urethral: 5175 mL  Total OUT: 5335 mL    Total NET: -3960 mL          MEDICATIONS:    ANTIBIOTICS:  cefTRIAXone   IVPB 1 Gram(s) IV Intermittent every 24 hours      PAIN CONTROL:  acetaminophen   Tablet. 650 milliGRAM(s) Oral every 6 hours PRN  buPROPion XL . 300 milliGRAM(s) Oral daily  diazepam    Tablet 5 milliGRAM(s) Oral three times a day PRN  ondansetron Injectable 4 milliGRAM(s) IV Push every 6 hours PRN  oxyCODONE    5 mG/acetaminophen 325 mG 1 Tablet(s) Oral every 4 hours PRN  oxyCODONE    5 mG/acetaminophen 325 mG 2 Tablet(s) Oral every 6 hours PRN       MEDS:  oxybutynin 5 milliGRAM(s) Oral three times a day PRN      HEME/ONC        PHYSICAL EXAM:  General: No acute distress.  Alert and Oriented  Abdominal Exam: soft, incision sites clean and dry, CLIFTON intact serosanguinous   Exam: FC intact, urine clear      LABS:                        12.5   7.3   )-----------( 161      ( 06 Nov 2017 09:45 )             36.3     11-06    135  |  100  |  19  ----------------------------<  133<H>  4.8   |  27  |  1.15    Ca    8.8      06 Nov 2017 09:45      PT/INR - ( 06 Nov 2017 09:45 )   PT: 11.8 sec;   INR: 1.06                RADIOLOGY & ADDITIONAL TESTS: None
